# Patient Record
Sex: MALE | Race: WHITE | URBAN - METROPOLITAN AREA
[De-identification: names, ages, dates, MRNs, and addresses within clinical notes are randomized per-mention and may not be internally consistent; named-entity substitution may affect disease eponyms.]

---

## 2017-03-27 ENCOUNTER — OFFICE (OUTPATIENT)
Dept: URBAN - METROPOLITAN AREA CLINIC 1 | Facility: CLINIC | Age: 72
Setting detail: OPHTHALMOLOGY
End: 2017-03-27
Payer: MEDICARE

## 2017-03-27 DIAGNOSIS — H43.391: ICD-10-CM

## 2017-03-27 DIAGNOSIS — H02.834: ICD-10-CM

## 2017-03-27 DIAGNOSIS — H25.13: ICD-10-CM

## 2017-03-27 DIAGNOSIS — H43.392: ICD-10-CM

## 2017-03-27 DIAGNOSIS — H35.40: ICD-10-CM

## 2017-03-27 DIAGNOSIS — H02.831: ICD-10-CM

## 2017-03-27 PROCEDURE — 92014 COMPRE OPH EXAM EST PT 1/>: CPT | Performed by: OPHTHALMOLOGY

## 2017-03-27 ASSESSMENT — REFRACTION_MANIFEST
OU_VA: 20/
OS_SPHERE: -7.25
OD_AXIS: 125
OD_SPHERE: -8.00
OS_VA1: 20/
OS_CYLINDER: +2.00
OD_VA3: 20/
OD_VA1: 20/40-
OS_VA2: 20/
OS_VA1: 20/
OS_VA2: 20/
OS_VA3: 20/
OU_VA: 20/
OS_VA1: 20/25-
OS_VA3: 20/
OS_VA2: 20/
OD_VA2: 20/
OU_VA: 20/
OS_AXIS: 070
OD_CYLINDER: +2.50
OD_VA2: 20/
OD_VA1: 20/
OD_VA3: 20/
OD_VA3: 20/
OD_VA2: 20/
OD_VA1: 20/
OS_VA3: 20/

## 2017-03-27 ASSESSMENT — REFRACTION_CURRENTRX
OS_AXIS: 069
OS_OVR_VA: 20/
OD_AXIS: 124
OS_VPRISM_DIRECTION: SV
OS_SPHERE: -7.50
OD_SPHERE: -7.25
OD_CYLINDER: +2.75
OD_OVR_VA: 20/
OD_OVR_VA: 20/
OS_OVR_VA: 20/
OS_CYLINDER: +2.75
OD_OVR_VA: 20/
OS_OVR_VA: 20/
OD_VPRISM_DIRECTION: SV

## 2017-03-27 ASSESSMENT — KERATOMETRY
OD_K2POWER_DIOPTERS: 44.00
OS_K2POWER_DIOPTERS: 43.25
OS_K1POWER_DIOPTERS: 41.25
OS_AXISANGLE_DEGREES: 076
METHOD_AUTO_MANUAL: AUTO
OD_K1POWER_DIOPTERS: 41.25
OD_AXISANGLE_DEGREES: 099

## 2017-03-27 ASSESSMENT — CONFRONTATIONAL VISUAL FIELD TEST (CVF)
OS_FINDINGS: FULL
OD_FINDINGS: FULL

## 2017-03-27 ASSESSMENT — AXIALLENGTH_DERIVED
OD_AL: 26.9651
OS_AL: 26.8908
OD_AL: 30.41
OS_AL: 27.0179

## 2017-03-27 ASSESSMENT — VISUAL ACUITY
OD_BCVA: 20/30+2
OS_BCVA: 20/50-1

## 2017-03-27 ASSESSMENT — REFRACTION_AUTOREFRACTION
OD_SPHERE: -16.00
OD_CYLINDER: +6.50
OS_AXIS: 065
OD_AXIS: 108
OS_SPHERE: -7.75
OS_CYLINDER: +2.50

## 2017-03-27 ASSESSMENT — SPHEQUIV_DERIVED
OS_SPHEQUIV: -6.5
OD_SPHEQUIV: -12.75
OD_SPHEQUIV: -6.75
OS_SPHEQUIV: -6.25

## 2017-03-27 ASSESSMENT — LID POSITION - DERMATOCHALASIS
OD_DERMATOCHALASIS: RUL 1+
OS_DERMATOCHALASIS: LUL 1+

## 2018-03-26 ENCOUNTER — OFFICE (OUTPATIENT)
Dept: URBAN - METROPOLITAN AREA CLINIC 1 | Facility: CLINIC | Age: 73
Setting detail: OPHTHALMOLOGY
End: 2018-03-26
Payer: MEDICARE

## 2018-03-26 DIAGNOSIS — H25.13: ICD-10-CM

## 2018-03-26 DIAGNOSIS — H02.834: ICD-10-CM

## 2018-03-26 DIAGNOSIS — H35.40: ICD-10-CM

## 2018-03-26 DIAGNOSIS — H02.831: ICD-10-CM

## 2018-03-26 DIAGNOSIS — H43.393: ICD-10-CM

## 2018-03-26 PROCEDURE — 92014 COMPRE OPH EXAM EST PT 1/>: CPT | Performed by: OPHTHALMOLOGY

## 2018-03-26 ASSESSMENT — REFRACTION_CURRENTRX
OD_OVR_VA: 20/
OS_OVR_VA: 20/
OS_SPHERE: -8.00
OS_OVR_VA: 20/
OS_CYLINDER: +2.75
OS_VPRISM_DIRECTION: SV
OD_VPRISM_DIRECTION: SV
OD_AXIS: 118
OD_CYLINDER: +3.00
OS_OVR_VA: 20/
OD_OVR_VA: 20/
OD_OVR_VA: 20/
OS_AXIS: 070
OD_SPHERE: -8.25

## 2018-03-26 ASSESSMENT — VISUAL ACUITY
OD_BCVA: 20/30-
OS_BCVA: 20/70

## 2018-03-26 ASSESSMENT — REFRACTION_MANIFEST
OD_VA2: 20/
OD_VA2: 20/
OS_VA2: 20/
OD_VA1: 20/70
OU_VA: 20/
OS_VA1: 20/30
OD_CYLINDER: +3.00
OS_VA3: 20/
OD_AXIS: 115
OS_VA2: 20/
OD_SPHERE: -8.00
OD_VA1: 20/
OU_VA: 20/
OS_VA3: 20/
OD_VA3: 20/
OU_VA: 20/
OD_VA2: 20/
OS_VA2: 20/
OS_SPHERE: -8.00
OS_AXIS: 070
OS_CYLINDER: +3.00
OS_VA1: 20/
OD_VA3: 20/
OS_VA1: 20/
OD_VA3: 20/
OS_VA3: 20/
OD_VA1: 20/

## 2018-03-26 ASSESSMENT — KERATOMETRY
OD_AXISANGLE_DEGREES: 102
OD_K1POWER_DIOPTERS: 41.00
METHOD_AUTO_MANUAL: AUTO
OS_AXISANGLE_DEGREES: 80
OD_K2POWER_DIOPTERS: 43.25
OS_K1POWER_DIOPTERS: 41.00
OS_K2POWER_DIOPTERS: 43.00

## 2018-03-26 ASSESSMENT — SPHEQUIV_DERIVED
OS_SPHEQUIV: -6.25
OD_SPHEQUIV: -15.375
OS_SPHEQUIV: -6.5
OD_SPHEQUIV: -6.5

## 2018-03-26 ASSESSMENT — LID POSITION - DERMATOCHALASIS
OS_DERMATOCHALASIS: LUL 1+
OD_DERMATOCHALASIS: RUL 1+

## 2018-03-26 ASSESSMENT — REFRACTION_AUTOREFRACTION
OD_CYLINDER: +6.75
OS_SPHERE: -7.50
OD_SPHERE: -18.75
OD_AXIS: 120
OS_CYLINDER: +2.50
OS_AXIS: 70

## 2018-03-26 ASSESSMENT — AXIALLENGTH_DERIVED
OS_AL: 27.1389
OD_AL: 27.0783
OS_AL: 27.0106
OD_AL: 32.55

## 2018-03-26 ASSESSMENT — CONFRONTATIONAL VISUAL FIELD TEST (CVF)
OD_FINDINGS: FULL
OS_FINDINGS: FULL

## 2018-10-15 ENCOUNTER — OFFICE (OUTPATIENT)
Dept: URBAN - METROPOLITAN AREA CLINIC 1 | Facility: CLINIC | Age: 73
Setting detail: OPHTHALMOLOGY
End: 2018-10-15
Payer: MEDICARE

## 2018-10-15 DIAGNOSIS — H35.40: ICD-10-CM

## 2018-10-15 DIAGNOSIS — H43.393: ICD-10-CM

## 2018-10-15 DIAGNOSIS — H25.13: ICD-10-CM

## 2018-10-15 PROCEDURE — 99214 OFFICE O/P EST MOD 30 MIN: CPT | Performed by: OPHTHALMOLOGY

## 2018-10-15 ASSESSMENT — REFRACTION_CURRENTRX
OD_CYLINDER: +3.00
OS_OVR_VA: 20/
OS_SPHERE: -8.00
OS_VPRISM_DIRECTION: SV
OS_OVR_VA: 20/
OD_OVR_VA: 20/
OD_AXIS: 118
OD_SPHERE: -8.25
OD_OVR_VA: 20/
OD_VPRISM_DIRECTION: SV
OD_OVR_VA: 20/
OS_OVR_VA: 20/
OS_AXIS: 070
OS_CYLINDER: +2.75

## 2018-10-15 ASSESSMENT — VISUAL ACUITY
OD_BCVA: 20/30-2
OS_BCVA: 20/125

## 2018-10-15 ASSESSMENT — SPHEQUIV_DERIVED
OD_SPHEQUIV: -6.875
OS_SPHEQUIV: -6.25
OS_SPHEQUIV: -6.25
OD_SPHEQUIV: -17

## 2018-10-15 ASSESSMENT — LID POSITION - DERMATOCHALASIS
OS_DERMATOCHALASIS: LUL 1+
OD_DERMATOCHALASIS: RUL 1+

## 2018-10-15 ASSESSMENT — CONFRONTATIONAL VISUAL FIELD TEST (CVF)
OS_FINDINGS: FULL
OD_FINDINGS: FULL

## 2018-10-15 ASSESSMENT — KERATOMETRY
OD_K1POWER_DIOPTERS: 41.25
OS_K1POWER_DIOPTERS: 41.25
OS_K2POWER_DIOPTERS: 43.00
METHOD_AUTO_MANUAL: AUTO
OD_AXISANGLE_DEGREES: 101
OD_K2POWER_DIOPTERS: 43.50
OS_AXISANGLE_DEGREES: 078

## 2018-10-15 ASSESSMENT — REFRACTION_MANIFEST
OD_VA3: 20/
OS_VA1: 20/
OD_SPHERE: -8.25
OD_AXIS: 120
OS_CYLINDER: +2.50
OD_VA2: 20/
OD_VA3: 20/
OS_AXIS: 060
OD_VA2: 20/
OS_VA3: 20/
OU_VA: 20/
OS_VA3: 20/
OS_VA3: 20/
OD_VA3: 20/
OD_VA2: 20/
OD_VA1: 20/
OU_VA: 20/
OS_SPHERE: -7.50
OD_VA1: 20/150
OS_VA2: 20/
OS_VA2: 20/
OD_VA1: 20/
OU_VA: 20/
OD_CYLINDER: +2.75
OS_VA2: 20/
OS_VA1: 20/30-1
OS_VA1: 20/

## 2018-10-15 ASSESSMENT — AXIALLENGTH_DERIVED
OS_AL: 26.9505
OD_AL: 33.62
OD_AL: 27.1499
OS_AL: 26.9505

## 2018-10-15 ASSESSMENT — REFRACTION_AUTOREFRACTION
OD_AXIS: 122
OD_SPHERE: -19.25
OS_CYLINDER: +2.50
OS_AXIS: 063
OD_CYLINDER: +4.50
OS_SPHERE: -7.50

## 2018-10-31 ENCOUNTER — OFFICE (OUTPATIENT)
Dept: URBAN - METROPOLITAN AREA CLINIC 12 | Facility: CLINIC | Age: 73
Setting detail: OPHTHALMOLOGY
End: 2018-10-31
Payer: MEDICARE

## 2018-10-31 DIAGNOSIS — H25.11: ICD-10-CM

## 2018-10-31 DIAGNOSIS — H25.13: ICD-10-CM

## 2018-10-31 PROCEDURE — 92012 INTRM OPH EXAM EST PATIENT: CPT | Performed by: OPHTHALMOLOGY

## 2018-10-31 PROCEDURE — 92136 OPHTHALMIC BIOMETRY: CPT | Performed by: OPHTHALMOLOGY

## 2018-10-31 ASSESSMENT — REFRACTION_CURRENTRX
OS_VPRISM_DIRECTION: SV
OS_SPHERE: -8.00
OD_AXIS: 118
OS_OVR_VA: 20/
OS_AXIS: 070
OD_OVR_VA: 20/
OS_OVR_VA: 20/
OD_VPRISM_DIRECTION: SV
OD_SPHERE: -8.25
OD_OVR_VA: 20/
OD_CYLINDER: +3.00
OD_OVR_VA: 20/
OS_OVR_VA: 20/
OS_CYLINDER: +2.75

## 2018-10-31 ASSESSMENT — REFRACTION_MANIFEST
OU_VA: 20/
OS_SPHERE: -7.50
OD_VA2: 20/
OS_VA2: 20/
OD_VA2: 20/
OS_VA3: 20/
OD_CYLINDER: +2.75
OD_VA2: 20/
OD_VA1: 20/150
OS_VA1: 20/
OS_CYLINDER: +2.50
OS_VA1: 20/30-1
OD_VA1: 20/
OD_VA3: 20/
OD_VA3: 20/
OD_SPHERE: -8.25
OD_VA3: 20/
OU_VA: 20/
OS_VA3: 20/
OD_AXIS: 120
OS_AXIS: 060
OS_VA2: 20/
OD_VA1: 20/
OU_VA: 20/
OS_VA3: 20/
OS_VA1: 20/
OS_VA2: 20/

## 2018-10-31 ASSESSMENT — KERATOMETRY
OD_K1POWER_DIOPTERS: 41.25
OD_AXISANGLE_DEGREES: 105
METHOD_AUTO_MANUAL: AUTO
OS_K1POWER_DIOPTERS: 41.50
OS_K2POWER_DIOPTERS: 43.25
OD_K2POWER_DIOPTERS: 43.50
OS_AXISANGLE_DEGREES: 074

## 2018-10-31 ASSESSMENT — SPHEQUIV_DERIVED
OS_SPHEQUIV: -6.25
OS_SPHEQUIV: -6.625
OD_SPHEQUIV: -6.875

## 2018-10-31 ASSESSMENT — AXIALLENGTH_DERIVED
OS_AL: 26.8312
OS_AL: 27.0216
OD_AL: 27.1499

## 2018-10-31 ASSESSMENT — LID POSITION - DERMATOCHALASIS
OS_DERMATOCHALASIS: LUL 1+
OD_DERMATOCHALASIS: RUL 1+

## 2018-10-31 ASSESSMENT — CONFRONTATIONAL VISUAL FIELD TEST (CVF)
OS_FINDINGS: FULL
OD_FINDINGS: FULL

## 2018-10-31 ASSESSMENT — VISUAL ACUITY
OD_BCVA: 20/50-2
OS_BCVA: 20/150

## 2018-10-31 ASSESSMENT — REFRACTION_AUTOREFRACTION
OS_SPHERE: -5.25
OS_CYLINDER: -2.75
OS_AXIS: 165

## 2018-11-08 ENCOUNTER — AMBULATORY SURGERY CENTER (OUTPATIENT)
Dept: URBAN - METROPOLITAN AREA SURGERY 27 | Facility: SURGERY | Age: 73
Setting detail: OPHTHALMOLOGY
End: 2018-11-08
Payer: MEDICARE

## 2018-11-08 DIAGNOSIS — H52.211: ICD-10-CM

## 2018-11-08 DIAGNOSIS — H25.11: ICD-10-CM

## 2018-11-08 PROCEDURE — A9270 NON-COVERED ITEM OR SERVICE: HCPCS | Performed by: OPHTHALMOLOGY

## 2018-11-08 PROCEDURE — FEMTO CATARACT LASER: Performed by: OPHTHALMOLOGY

## 2018-11-08 PROCEDURE — 66984 XCAPSL CTRC RMVL W/O ECP: CPT | Performed by: OPHTHALMOLOGY

## 2018-11-09 ENCOUNTER — RX ONLY (RX ONLY)
Age: 73
End: 2018-11-09

## 2018-11-09 ENCOUNTER — OFFICE (OUTPATIENT)
Dept: URBAN - METROPOLITAN AREA CLINIC 12 | Facility: CLINIC | Age: 73
Setting detail: OPHTHALMOLOGY
End: 2018-11-09

## 2018-11-09 DIAGNOSIS — H25.11: ICD-10-CM

## 2018-11-09 PROCEDURE — 99024 POSTOP FOLLOW-UP VISIT: CPT | Performed by: OPTOMETRIST

## 2018-11-09 ASSESSMENT — REFRACTION_MANIFEST
OS_VA3: 20/
OS_VA1: 20/
OD_VA3: 20/
OD_VA1: 20/150
OS_VA2: 20/
OU_VA: 20/
OD_VA1: 20/
OS_VA2: 20/
OD_VA2: 20/
OS_VA3: 20/
OD_VA2: 20/
OS_VA1: 20/
OS_CYLINDER: +2.50
OS_SPHERE: -7.50
OS_VA2: 20/
OU_VA: 20/
OS_AXIS: 060
OD_SPHERE: -8.25
OD_VA3: 20/
OS_VA3: 20/
OD_VA1: 20/
OD_CYLINDER: +2.75
OD_AXIS: 120
OD_VA2: 20/
OS_VA1: 20/30-1
OD_VA3: 20/
OU_VA: 20/

## 2018-11-09 ASSESSMENT — REFRACTION_AUTOREFRACTION
OS_CYLINDER: -2.00
OD_SPHERE: +0.75
OD_CYLINDER: -1.25
OS_AXIS: 149
OS_SPHERE: -5.00
OD_AXIS: 43

## 2018-11-09 ASSESSMENT — REFRACTION_CURRENTRX
OS_VPRISM_DIRECTION: SV
OS_OVR_VA: 20/
OS_CYLINDER: +2.75
OS_OVR_VA: 20/
OS_AXIS: 070
OD_OVR_VA: 20/
OS_SPHERE: -8.00
OD_OVR_VA: 20/
OD_AXIS: 118
OD_VPRISM_DIRECTION: SV
OS_OVR_VA: 20/
OD_OVR_VA: 20/
OD_CYLINDER: +3.00
OD_SPHERE: -8.25

## 2018-11-09 ASSESSMENT — AXIALLENGTH_DERIVED
OS_AL: 26.7058
OD_AL: 23.3618
OS_AL: 26.8312
OD_AL: 26.3818

## 2018-11-09 ASSESSMENT — KERATOMETRY
OS_K1POWER_DIOPTERS: 41.50
METHOD_AUTO_MANUAL: AUTO
OS_AXISANGLE_DEGREES: 74
OD_AXISANGLE_DEGREES: 110
OD_K1POWER_DIOPTERS: 43.00
OS_K2POWER_DIOPTERS: 43.25
OD_K2POWER_DIOPTERS: 45.00

## 2018-11-09 ASSESSMENT — VISUAL ACUITY
OS_BCVA: 20/50+
OD_BCVA: 20/50

## 2018-11-09 ASSESSMENT — SPHEQUIV_DERIVED
OS_SPHEQUIV: -6.25
OD_SPHEQUIV: 0.125
OD_SPHEQUIV: -6.875
OS_SPHEQUIV: -6

## 2018-11-09 ASSESSMENT — LID POSITION - DERMATOCHALASIS
OD_DERMATOCHALASIS: RUL 1+
OS_DERMATOCHALASIS: LUL 1+

## 2018-11-09 ASSESSMENT — CONFRONTATIONAL VISUAL FIELD TEST (CVF)
OD_FINDINGS: FULL
OS_FINDINGS: FULL

## 2018-11-15 ENCOUNTER — OFFICE (OUTPATIENT)
Dept: URBAN - METROPOLITAN AREA CLINIC 1 | Facility: CLINIC | Age: 73
Setting detail: OPHTHALMOLOGY
End: 2018-11-15
Payer: MEDICARE

## 2018-11-15 DIAGNOSIS — H25.12: ICD-10-CM

## 2018-11-15 PROCEDURE — 92136 OPHTHALMIC BIOMETRY: CPT | Performed by: OPHTHALMOLOGY

## 2018-11-15 ASSESSMENT — KERATOMETRY
OS_K1POWER_DIOPTERS: 41.25
OD_K2POWER_DIOPTERS: 44.25
OD_K1POWER_DIOPTERS: 41.75
OD_AXISANGLE_DEGREES: 94
OS_AXISANGLE_DEGREES: 83
OS_K2POWER_DIOPTERS: 43.25
METHOD_AUTO_MANUAL: AUTO

## 2018-11-15 ASSESSMENT — SPHEQUIV_DERIVED
OD_SPHEQUIV: -0.375
OD_SPHEQUIV: -6.875
OS_SPHEQUIV: -6.25
OS_SPHEQUIV: -6.875

## 2018-11-15 ASSESSMENT — REFRACTION_AUTOREFRACTION
OS_SPHERE: -8.25
OS_AXIS: 72
OD_AXIS: 111
OD_SPHERE: -0.75
OS_CYLINDER: +2.75
OD_CYLINDER: +0.75

## 2018-11-15 ASSESSMENT — REFRACTION_MANIFEST
OS_VA2: 20/
OD_VA3: 20/
OD_VA3: 20/
OS_AXIS: 060
OS_VA3: 20/
OU_VA: 20/
OS_VA3: 20/
OS_VA3: 20/
OS_VA1: 20/30-1
OS_VA2: 20/
OD_VA2: 20/
OD_AXIS: 120
OS_CYLINDER: +2.50
OS_VA2: 20/
OD_VA1: 20/
OD_VA2: 20/
OU_VA: 20/
OD_VA3: 20/
OD_VA2: 20/
OD_VA1: 20/150
OD_VA1: 20/
OS_SPHERE: -7.50
OD_CYLINDER: +2.75
OS_VA1: 20/
OS_VA1: 20/
OD_SPHERE: -8.25
OU_VA: 20/

## 2018-11-15 ASSESSMENT — REFRACTION_CURRENTRX
OS_SPHERE: -8.00
OS_OVR_VA: 20/
OD_AXIS: 118
OS_CYLINDER: +2.75
OS_OVR_VA: 20/
OS_VPRISM_DIRECTION: SV
OS_OVR_VA: 20/
OD_OVR_VA: 20/
OD_OVR_VA: 20/
OS_AXIS: 070
OD_VPRISM_DIRECTION: SV
OD_CYLINDER: +3.00
OD_SPHERE: -8.25
OD_OVR_VA: 20/

## 2018-11-15 ASSESSMENT — LID POSITION - DERMATOCHALASIS
OS_DERMATOCHALASIS: LUL 1+
OD_DERMATOCHALASIS: RUL 1+

## 2018-11-15 ASSESSMENT — AXIALLENGTH_DERIVED
OS_AL: 27.2109
OD_AL: 26.8493
OS_AL: 26.8908
OD_AL: 23.9263

## 2018-11-15 ASSESSMENT — CONFRONTATIONAL VISUAL FIELD TEST (CVF)
OS_FINDINGS: FULL
OD_FINDINGS: FULL

## 2018-11-15 ASSESSMENT — VISUAL ACUITY
OS_BCVA: 20/30-2
OD_BCVA: 20/250

## 2018-11-19 ENCOUNTER — AMBULATORY SURGERY CENTER (OUTPATIENT)
Dept: URBAN - METROPOLITAN AREA SURGERY 27 | Facility: SURGERY | Age: 73
Setting detail: OPHTHALMOLOGY
End: 2018-11-19
Payer: MEDICARE

## 2018-11-19 DIAGNOSIS — H25.12: ICD-10-CM

## 2018-11-19 DIAGNOSIS — H57.03: ICD-10-CM

## 2018-11-19 DIAGNOSIS — H52.212: ICD-10-CM

## 2018-11-19 PROCEDURE — FEMTO CATARACT LASER: Performed by: OPHTHALMOLOGY

## 2018-11-19 PROCEDURE — A9270 NON-COVERED ITEM OR SERVICE: HCPCS | Performed by: OPHTHALMOLOGY

## 2018-11-19 PROCEDURE — 66982 XCAPSL CTRC RMVL CPLX WO ECP: CPT | Performed by: OPHTHALMOLOGY

## 2018-11-20 ENCOUNTER — RX ONLY (RX ONLY)
Age: 73
End: 2018-11-20

## 2018-11-20 ENCOUNTER — OFFICE (OUTPATIENT)
Dept: URBAN - METROPOLITAN AREA CLINIC 12 | Facility: CLINIC | Age: 73
Setting detail: OPHTHALMOLOGY
End: 2018-11-20

## 2018-11-20 DIAGNOSIS — H25.13: ICD-10-CM

## 2018-11-20 PROCEDURE — 99024 POSTOP FOLLOW-UP VISIT: CPT | Performed by: OPHTHALMOLOGY

## 2018-11-20 ASSESSMENT — REFRACTION_MANIFEST
OD_VA3: 20/
OS_VA2: 20/
OU_VA: 20/
OS_VA3: 20/
OD_SPHERE: -8.25
OD_VA3: 20/
OS_VA2: 20/
OS_VA1: 20/30-1
OS_VA2: 20/
OS_CYLINDER: +2.50
OD_VA2: 20/
OD_AXIS: 120
OD_VA1: 20/
OS_SPHERE: -7.50
OS_VA3: 20/
OU_VA: 20/
OD_VA1: 20/
OD_VA2: 20/
OU_VA: 20/
OD_VA1: 20/150
OS_VA1: 20/
OS_AXIS: 060
OD_CYLINDER: +2.75
OD_VA3: 20/
OD_VA2: 20/
OS_VA3: 20/
OS_VA1: 20/

## 2018-11-20 ASSESSMENT — REFRACTION_AUTOREFRACTION
OD_AXIS: 021
OS_CYLINDER: -0.25
OD_CYLINDER: -0.75
OS_SPHERE: +0.25
OD_SPHERE: PLANO
OS_AXIS: 152

## 2018-11-20 ASSESSMENT — REFRACTION_CURRENTRX
OD_CYLINDER: +3.00
OD_OVR_VA: 20/
OD_VPRISM_DIRECTION: SV
OS_OVR_VA: 20/
OD_SPHERE: -8.25
OS_CYLINDER: +2.75
OS_VPRISM_DIRECTION: SV
OD_OVR_VA: 20/
OS_OVR_VA: 20/
OS_OVR_VA: 20/
OD_AXIS: 118
OS_SPHERE: -8.00
OS_AXIS: 070
OD_OVR_VA: 20/

## 2018-11-20 ASSESSMENT — LID POSITION - DERMATOCHALASIS
OS_DERMATOCHALASIS: LUL 1+
OD_DERMATOCHALASIS: RUL 1+

## 2018-11-20 ASSESSMENT — KERATOMETRY
OD_K2POWER_DIOPTERS: 43.25
OD_AXISANGLE_DEGREES: 117
OS_AXISANGLE_DEGREES: 053
METHOD_AUTO_MANUAL: AUTO
OS_K2POWER_DIOPTERS: 42.50
OS_K1POWER_DIOPTERS: 42.25
OD_K1POWER_DIOPTERS: 42.50

## 2018-11-20 ASSESSMENT — CONFRONTATIONAL VISUAL FIELD TEST (CVF)
OD_FINDINGS: FULL
OS_FINDINGS: FULL

## 2018-11-20 ASSESSMENT — AXIALLENGTH_DERIVED
OD_AL: 26.9089
OS_AL: 26.8312
OS_AL: 23.9621

## 2018-11-20 ASSESSMENT — VISUAL ACUITY
OS_BCVA: 20/25-2
OD_BCVA: 20/40+1

## 2018-11-20 ASSESSMENT — SPHEQUIV_DERIVED
OS_SPHEQUIV: -6.25
OS_SPHEQUIV: 0.125
OD_SPHEQUIV: -6.875

## 2018-11-26 ENCOUNTER — OFFICE (OUTPATIENT)
Dept: URBAN - METROPOLITAN AREA CLINIC 1 | Facility: CLINIC | Age: 73
Setting detail: OPHTHALMOLOGY
End: 2018-11-26

## 2018-11-26 DIAGNOSIS — H25.13: ICD-10-CM

## 2018-11-26 PROCEDURE — 99024 POSTOP FOLLOW-UP VISIT: CPT | Performed by: OPHTHALMOLOGY

## 2018-11-26 ASSESSMENT — REFRACTION_AUTOREFRACTION
OS_CYLINDER: +0.50
OD_CYLINDER: +1.25
OD_AXIS: 091
OS_SPHERE: +0.25
OD_SPHERE: -1.25
OS_AXIS: 069

## 2018-11-26 ASSESSMENT — REFRACTION_MANIFEST
OS_SPHERE: -7.50
OS_VA1: 20/
OS_VA3: 20/
OU_VA: 20/
OS_VA2: 20/
OU_VA: 20/
OU_VA: 20/
OD_VA2: 20/
OD_SPHERE: -8.25
OS_VA3: 20/
OS_VA3: 20/
OD_VA1: 20/
OS_VA1: 20/
OS_VA2: 20/
OS_AXIS: 060
OD_AXIS: 120
OD_VA3: 20/
OS_VA2: 20/
OS_CYLINDER: +2.50
OD_CYLINDER: +2.75
OS_VA1: 20/30-1
OD_VA1: 20/150
OD_VA3: 20/
OD_VA3: 20/
OD_VA2: 20/
OD_VA2: 20/
OD_VA1: 20/

## 2018-11-26 ASSESSMENT — VISUAL ACUITY
OS_BCVA: 20/20
OD_BCVA: 20/20

## 2018-11-26 ASSESSMENT — LID POSITION - DERMATOCHALASIS
OS_DERMATOCHALASIS: LUL 1+
OD_DERMATOCHALASIS: RUL 1+

## 2018-11-26 ASSESSMENT — AXIALLENGTH_DERIVED
OD_AL: 27.0289
OD_AL: 24.1707
OS_AL: 26.8908
OS_AL: 23.8592

## 2018-11-26 ASSESSMENT — SPHEQUIV_DERIVED
OS_SPHEQUIV: -6.25
OS_SPHEQUIV: 0.5
OD_SPHEQUIV: -0.625
OD_SPHEQUIV: -6.875

## 2018-11-26 ASSESSMENT — REFRACTION_CURRENTRX
OD_OVR_VA: 20/
OS_OVR_VA: 20/
OS_VPRISM_DIRECTION: SV
OD_VPRISM_DIRECTION: SV
OD_SPHERE: -8.25
OS_SPHERE: -8.00
OS_AXIS: 070
OS_OVR_VA: 20/
OD_CYLINDER: +3.00
OS_OVR_VA: 20/
OD_AXIS: 118
OS_CYLINDER: +2.75

## 2018-11-26 ASSESSMENT — KERATOMETRY
OD_AXISANGLE_DEGREES: 089
METHOD_AUTO_MANUAL: AUTO
OD_K2POWER_DIOPTERS: 43.25
OD_K1POWER_DIOPTERS: 42.00
OS_AXISANGLE_DEGREES: 076
OS_K2POWER_DIOPTERS: 42.75
OS_K1POWER_DIOPTERS: 41.75

## 2018-12-03 ENCOUNTER — RX ONLY (RX ONLY)
Age: 73
End: 2018-12-03

## 2018-12-03 ENCOUNTER — OFFICE (OUTPATIENT)
Dept: URBAN - METROPOLITAN AREA CLINIC 1 | Facility: CLINIC | Age: 73
Setting detail: OPHTHALMOLOGY
End: 2018-12-03

## 2018-12-03 DIAGNOSIS — H25.13: ICD-10-CM

## 2018-12-03 PROBLEM — S05.02XA CORNEAL ABRASION; INITIAL ENCOUNTER: Status: RESOLVED | Noted: 2018-11-26 | Resolved: 2018-12-03

## 2018-12-03 PROCEDURE — 99024 POSTOP FOLLOW-UP VISIT: CPT | Performed by: OPHTHALMOLOGY

## 2018-12-03 ASSESSMENT — REFRACTION_MANIFEST
OU_VA: 20/
OD_VA3: 20/
OD_CYLINDER: +2.75
OD_SPHERE: -8.25
OU_VA: 20/
OS_SPHERE: -7.50
OS_VA2: 20/
OS_CYLINDER: +2.50
OS_VA3: 20/
OS_VA1: 20/
OS_AXIS: 060
OD_VA1: 20/150
OD_VA3: 20/
OD_AXIS: 120
OD_VA3: 20/
OD_VA2: 20/
OD_VA2: 20/
OD_VA1: 20/
OD_VA2: 20/
OS_VA1: 20/
OS_VA2: 20/
OS_VA2: 20/
OU_VA: 20/
OD_VA1: 20/
OS_VA3: 20/
OS_VA3: 20/
OS_VA1: 20/30-1

## 2018-12-03 ASSESSMENT — CONFRONTATIONAL VISUAL FIELD TEST (CVF)
OD_FINDINGS: FULL
OS_FINDINGS: FULL

## 2018-12-03 ASSESSMENT — REFRACTION_CURRENTRX
OD_OVR_VA: 20/
OD_CYLINDER: +3.00
OD_AXIS: 118
OS_VPRISM_DIRECTION: SV
OS_SPHERE: -8.00
OD_VPRISM_DIRECTION: SV
OS_OVR_VA: 20/
OS_OVR_VA: 20/
OD_OVR_VA: 20/
OS_CYLINDER: +2.75
OD_OVR_VA: 20/
OS_AXIS: 070
OS_OVR_VA: 20/
OD_SPHERE: -8.25

## 2018-12-03 ASSESSMENT — KERATOMETRY
OS_K2POWER_DIOPTERS: 42.75
METHOD_AUTO_MANUAL: AUTO
OS_AXISANGLE_DEGREES: 094
OS_K1POWER_DIOPTERS: 42.00
OD_AXISANGLE_DEGREES: 094
OD_K1POWER_DIOPTERS: 41.75
OD_K2POWER_DIOPTERS: 43.00

## 2018-12-03 ASSESSMENT — REFRACTION_AUTOREFRACTION
OD_SPHERE: -0.25
OD_CYLINDER: +0.50
OS_AXIS: 068
OD_AXIS: 109
OS_SPHERE: PLANO
OS_CYLINDER: +0.50

## 2018-12-03 ASSESSMENT — SPHEQUIV_DERIVED
OD_SPHEQUIV: 0
OD_SPHEQUIV: -6.875
OS_SPHEQUIV: -6.25

## 2018-12-03 ASSESSMENT — VISUAL ACUITY
OD_BCVA: 20/25
OS_BCVA: 20/20-2

## 2018-12-03 ASSESSMENT — LID POSITION - DERMATOCHALASIS
OD_DERMATOCHALASIS: RUL 1+
OS_DERMATOCHALASIS: LUL 1+

## 2018-12-03 ASSESSMENT — AXIALLENGTH_DERIVED
OD_AL: 27.1499
OD_AL: 24.0125
OS_AL: 26.8312

## 2018-12-20 ENCOUNTER — OFFICE (OUTPATIENT)
Dept: URBAN - METROPOLITAN AREA CLINIC 1 | Facility: CLINIC | Age: 73
Setting detail: OPHTHALMOLOGY
End: 2018-12-20
Payer: MEDICARE

## 2018-12-20 DIAGNOSIS — H25.13: ICD-10-CM

## 2018-12-20 PROCEDURE — 99024 POSTOP FOLLOW-UP VISIT: CPT | Performed by: OPHTHALMOLOGY

## 2018-12-20 ASSESSMENT — REFRACTION_MANIFEST
OD_VA3: 20/
OU_VA: 20/
OD_VA1: 20/
OS_AXIS: 085
OD_VA2: 20/
OS_VA2: 20/
OS_VA1: 20/20-
OD_AXIS: 105
OD_VA1: 20/20-
OS_CYLINDER: +0.50
OD_CYLINDER: +1.00
OD_VA2: 20/
OD_VA3: 20/
OD_SPHERE: -0.75
OS_VA3: 20/
OS_SPHERE: -0.25
OS_VA2: 20/
OS_VA3: 20/
OS_VA1: 20/
OU_VA: 20/

## 2018-12-20 ASSESSMENT — REFRACTION_AUTOREFRACTION
OS_SPHERE: +0.25
OD_CYLINDER: +0.50
OS_CYLINDER: +0.50
OD_SPHERE: -0.25
OS_AXIS: 82
OD_AXIS: 104

## 2018-12-20 ASSESSMENT — REFRACTION_CURRENTRX
OD_OVR_VA: 20/
OS_SPHERE: +2.50
OS_OVR_VA: 20/
OD_OVR_VA: 20/
OS_OVR_VA: 20/
OD_OVR_VA: 20/
OS_OVR_VA: 20/
OD_SPHERE: +2.50
OD_VPRISM_DIRECTION: SV
OS_VPRISM_DIRECTION: SV

## 2018-12-20 ASSESSMENT — LID POSITION - DERMATOCHALASIS
OD_DERMATOCHALASIS: RUL 1+
OS_DERMATOCHALASIS: LUL 1+

## 2018-12-20 ASSESSMENT — KERATOMETRY
METHOD_AUTO_MANUAL: AUTO
OD_K1POWER_DIOPTERS: 41.75
OS_K1POWER_DIOPTERS: 41.75
OD_K2POWER_DIOPTERS: 42.50
OS_K2POWER_DIOPTERS: 43.25
OS_AXISANGLE_DEGREES: 95
OD_AXISANGLE_DEGREES: 94

## 2018-12-20 ASSESSMENT — VISUAL ACUITY
OS_BCVA: 20/25-2
OD_BCVA: 20/25-2

## 2018-12-20 ASSESSMENT — AXIALLENGTH_DERIVED
OS_AL: 23.965
OD_AL: 24.2101
OS_AL: 23.7657
OD_AL: 24.108

## 2018-12-20 ASSESSMENT — SPHEQUIV_DERIVED
OD_SPHEQUIV: 0
OS_SPHEQUIV: 0.5
OS_SPHEQUIV: 0
OD_SPHEQUIV: -0.25

## 2019-03-25 ENCOUNTER — OFFICE (OUTPATIENT)
Dept: URBAN - METROPOLITAN AREA CLINIC 1 | Facility: CLINIC | Age: 74
Setting detail: OPHTHALMOLOGY
End: 2019-03-25
Payer: MEDICARE

## 2019-03-25 DIAGNOSIS — H02.834: ICD-10-CM

## 2019-03-25 DIAGNOSIS — H43.393: ICD-10-CM

## 2019-03-25 DIAGNOSIS — H02.831: ICD-10-CM

## 2019-03-25 DIAGNOSIS — H35.40: ICD-10-CM

## 2019-03-25 PROCEDURE — 92014 COMPRE OPH EXAM EST PT 1/>: CPT | Performed by: OPHTHALMOLOGY

## 2019-03-25 ASSESSMENT — REFRACTION_MANIFEST
OD_SPHERE: -0.75
OU_VA: 20/
OD_CYLINDER: +1.00
OS_VA2: 20/
OD_VA1: 20/
OD_VA2: 20/
OD_VA3: 20/
OS_VA3: 20/
OS_VA2: 20/
OD_VA3: 20/
OS_VA1: 20/20-
OU_VA: 20/
OD_VA1: 20/20-
OS_VA3: 20/
OS_AXIS: 085
OS_VA1: 20/
OD_AXIS: 105
OD_VA2: 20/
OS_SPHERE: -0.25
OS_CYLINDER: +0.50

## 2019-03-25 ASSESSMENT — VISUAL ACUITY
OS_BCVA: 20/20-1
OD_BCVA: 20/20-1

## 2019-03-25 ASSESSMENT — KERATOMETRY
OD_K2POWER_DIOPTERS: 42.75
OD_AXISANGLE_DEGREES: 095
OS_K1POWER_DIOPTERS: 42.00
METHOD_AUTO_MANUAL: AUTO
OD_K1POWER_DIOPTERS: 42.25
OS_K2POWER_DIOPTERS: 42.50
OS_AXISANGLE_DEGREES: 090

## 2019-03-25 ASSESSMENT — REFRACTION_CURRENTRX
OS_SPHERE: +2.50
OD_SPHERE: +2.50
OD_OVR_VA: 20/
OS_OVR_VA: 20/
OD_OVR_VA: 20/
OD_OVR_VA: 20/
OS_OVR_VA: 20/
OS_VPRISM_DIRECTION: SV
OD_VPRISM_DIRECTION: SV
OS_OVR_VA: 20/

## 2019-03-25 ASSESSMENT — REFRACTION_AUTOREFRACTION
OS_SPHERE: -0.25
OD_CYLINDER: +0.25
OS_AXIS: 085
OD_AXIS: 077
OS_CYLINDER: +0.75
OD_SPHERE: PLANO

## 2019-03-25 ASSESSMENT — SPHEQUIV_DERIVED
OS_SPHEQUIV: 0
OD_SPHEQUIV: -0.25
OS_SPHEQUIV: 0.125

## 2019-03-25 ASSESSMENT — AXIALLENGTH_DERIVED
OS_AL: 24.0096
OD_AL: 24.066
OS_AL: 24.0602

## 2019-03-25 ASSESSMENT — LID POSITION - DERMATOCHALASIS
OD_DERMATOCHALASIS: RUL 1+
OS_DERMATOCHALASIS: LUL 1+

## 2020-07-23 ENCOUNTER — OFFICE (OUTPATIENT)
Dept: URBAN - METROPOLITAN AREA CLINIC 1 | Facility: CLINIC | Age: 75
Setting detail: OPHTHALMOLOGY
End: 2020-07-23
Payer: MEDICARE

## 2020-07-23 DIAGNOSIS — H43.393: ICD-10-CM

## 2020-07-23 DIAGNOSIS — H02.834: ICD-10-CM

## 2020-07-23 DIAGNOSIS — H02.831: ICD-10-CM

## 2020-07-23 DIAGNOSIS — H43.813: ICD-10-CM

## 2020-07-23 PROCEDURE — 92014 COMPRE OPH EXAM EST PT 1/>: CPT | Performed by: OPHTHALMOLOGY

## 2020-07-23 ASSESSMENT — REFRACTION_AUTOREFRACTION
OS_CYLINDER: +0.75
OS_SPHERE: PL
OD_AXIS: 161
OD_SPHERE: PL
OS_AXIS: 080
OD_CYLINDER: +0.25

## 2020-07-23 ASSESSMENT — REFRACTION_CURRENTRX
OS_VPRISM_DIRECTION: SV
OD_SPHERE: +2.25
OD_VPRISM_DIRECTION: SV
OS_SPHERE: +2.25
OD_OVR_VA: 20/
OS_OVR_VA: 20/

## 2020-07-23 ASSESSMENT — REFRACTION_MANIFEST
OS_VA1: 20/20-
OD_SPHERE: PL
OS_SPHERE: PL
OS_CYLINDER: +0.50
OD_VA1: 20/20-1
OS_AXIS: 080
OD_AXIS: 165
OD_CYLINDER: +0.25

## 2020-07-23 ASSESSMENT — TONOMETRY
OS_IOP_MMHG: 19
OD_IOP_MMHG: 18

## 2020-07-23 ASSESSMENT — KERATOMETRY
OD_K2POWER_DIOPTERS: 43.00
OS_K1POWER_DIOPTERS: 42.00
METHOD_AUTO_MANUAL: AUTO
OD_K1POWER_DIOPTERS: 42.00
OD_AXISANGLE_DEGREES: 086
OS_K2POWER_DIOPTERS: 43.75
OS_AXISANGLE_DEGREES: 093

## 2020-07-23 ASSESSMENT — VISUAL ACUITY
OS_BCVA: 20/20-1
OD_BCVA: 20/25

## 2020-07-23 ASSESSMENT — LID EXAM ASSESSMENTS
OD_BLEPHARITIS: RUL
OS_BLEPHARITIS: LUL

## 2020-07-23 ASSESSMENT — CONFRONTATIONAL VISUAL FIELD TEST (CVF)
OD_FINDINGS: FULL
OS_FINDINGS: FULL

## 2020-07-23 ASSESSMENT — LID POSITION - DERMATOCHALASIS
OS_DERMATOCHALASIS: LUL 1+
OD_DERMATOCHALASIS: RUL 1+

## 2021-07-21 PROBLEM — H40.033 NARROW ANGLES; BOTH EYES: Status: ACTIVE | Noted: 2019-07-16

## 2021-07-22 ENCOUNTER — OFFICE (OUTPATIENT)
Dept: URBAN - METROPOLITAN AREA CLINIC 1 | Facility: CLINIC | Age: 76
Setting detail: OPHTHALMOLOGY
End: 2021-07-22
Payer: MEDICARE

## 2021-07-22 DIAGNOSIS — H01.001: ICD-10-CM

## 2021-07-22 DIAGNOSIS — H43.393: ICD-10-CM

## 2021-07-22 DIAGNOSIS — H43.813: ICD-10-CM

## 2021-07-22 DIAGNOSIS — H02.831: ICD-10-CM

## 2021-07-22 DIAGNOSIS — H35.40: ICD-10-CM

## 2021-07-22 DIAGNOSIS — H01.004: ICD-10-CM

## 2021-07-22 DIAGNOSIS — Z96.1: ICD-10-CM

## 2021-07-22 DIAGNOSIS — H02.834: ICD-10-CM

## 2021-07-22 PROCEDURE — 92014 COMPRE OPH EXAM EST PT 1/>: CPT | Performed by: OPHTHALMOLOGY

## 2021-07-22 ASSESSMENT — REFRACTION_CURRENTRX
OD_SPHERE: +2.25
OD_OVR_VA: 20/
OS_OVR_VA: 20/
OS_SPHERE: +2.25
OD_VPRISM_DIRECTION: SV
OS_VPRISM_DIRECTION: SV

## 2021-07-22 ASSESSMENT — REFRACTION_MANIFEST
OD_CYLINDER: -0.50
OD_VA1: 20/20
OD_SPHERE: +0.50
OS_VA1: 20/20
OS_CYLINDER: -0.75
OS_SPHERE: +0.75
OD_AXIS: 175
OS_AXIS: 170

## 2021-07-22 ASSESSMENT — REFRACTION_AUTOREFRACTION
OS_CYLINDER: -0.75
OD_CYLINDER: -0.50
OS_SPHERE: +0.75
OS_AXIS: 170
OD_AXIS: 175
OD_SPHERE: +0.50

## 2021-07-22 ASSESSMENT — SPHEQUIV_DERIVED
OS_SPHEQUIV: 0.375
OS_SPHEQUIV: 0.375
OD_SPHEQUIV: 0.25
OD_SPHEQUIV: 0.25

## 2021-07-22 ASSESSMENT — KERATOMETRY
OD_K1POWER_DIOPTERS: 41.00
OD_AXISANGLE_DEGREES: 80
OS_K1POWER_DIOPTERS: 41.50
OD_K2POWER_DIOPTERS: 42.25
METHOD_AUTO_MANUAL: AUTO
OS_AXISANGLE_DEGREES: 89
OS_K2POWER_DIOPTERS: 43.25

## 2021-07-22 ASSESSMENT — LID POSITION - DERMATOCHALASIS
OS_DERMATOCHALASIS: LUL 1+
OD_DERMATOCHALASIS: RUL 1+

## 2021-07-22 ASSESSMENT — LID EXAM ASSESSMENTS
OD_BLEPHARITIS: RUL
OS_BLEPHARITIS: LUL

## 2021-07-22 ASSESSMENT — AXIALLENGTH_DERIVED
OS_AL: 23.8621
OD_AL: 24.1984
OS_AL: 23.8621
OD_AL: 24.1984

## 2021-07-22 ASSESSMENT — CONFRONTATIONAL VISUAL FIELD TEST (CVF)
OS_FINDINGS: FULL
OD_FINDINGS: FULL

## 2021-07-22 ASSESSMENT — VISUAL ACUITY
OD_BCVA: 20/20
OS_BCVA: 20/20

## 2021-07-22 ASSESSMENT — TONOMETRY
OD_IOP_MMHG: 16
OS_IOP_MMHG: 13

## 2022-07-21 ENCOUNTER — OFFICE (OUTPATIENT)
Dept: URBAN - METROPOLITAN AREA CLINIC 1 | Facility: CLINIC | Age: 77
Setting detail: OPHTHALMOLOGY
End: 2022-07-21
Payer: MEDICARE

## 2022-07-21 DIAGNOSIS — H35.40: ICD-10-CM

## 2022-07-21 DIAGNOSIS — H43.393: ICD-10-CM

## 2022-07-21 DIAGNOSIS — H02.834: ICD-10-CM

## 2022-07-21 DIAGNOSIS — H02.831: ICD-10-CM

## 2022-07-21 DIAGNOSIS — H01.001: ICD-10-CM

## 2022-07-21 DIAGNOSIS — Z96.1: ICD-10-CM

## 2022-07-21 DIAGNOSIS — H01.004: ICD-10-CM

## 2022-07-21 DIAGNOSIS — H43.813: ICD-10-CM

## 2022-07-21 PROBLEM — H35.412 LATTICE DEGENERATION; LEFT EYE: Status: ACTIVE | Noted: 2022-07-21

## 2022-07-21 PROCEDURE — 92014 COMPRE OPH EXAM EST PT 1/>: CPT | Performed by: OPHTHALMOLOGY

## 2022-07-21 ASSESSMENT — REFRACTION_AUTOREFRACTION
OS_SPHERE: +0.50
OD_CYLINDER: -0.25
OD_SPHERE: +0.50
OS_CYLINDER: -0.50
OS_AXIS: 157
OD_AXIS: 146

## 2022-07-21 ASSESSMENT — VISUAL ACUITY
OS_BCVA: 20/20-2
OD_BCVA: 20/20-1

## 2022-07-21 ASSESSMENT — TONOMETRY
OD_IOP_MMHG: 16
OS_IOP_MMHG: 13

## 2022-07-21 ASSESSMENT — SPHEQUIV_DERIVED
OS_SPHEQUIV: 0.25
OD_SPHEQUIV: 0.375
OD_SPHEQUIV: 0.375
OS_SPHEQUIV: 0.25

## 2022-07-21 ASSESSMENT — REFRACTION_CURRENTRX
OD_OVR_VA: 20/
OS_VPRISM_DIRECTION: SV
OD_VPRISM_DIRECTION: SV
OD_SPHERE: +2.25
OS_SPHERE: +2.25
OS_OVR_VA: 20/

## 2022-07-21 ASSESSMENT — KERATOMETRY
OD_K1POWER_DIOPTERS: 41.75
OS_AXISANGLE_DEGREES: 089
OS_K1POWER_DIOPTERS: 42.00
OS_K2POWER_DIOPTERS: 43.00
OD_AXISANGLE_DEGREES: 082
METHOD_AUTO_MANUAL: AUTO
OD_K2POWER_DIOPTERS: 42.50

## 2022-07-21 ASSESSMENT — REFRACTION_MANIFEST
OD_SPHERE: +0.50
OD_CYLINDER: -0.25
OD_AXIS: 146
OS_AXIS: 157
OS_CYLINDER: -0.50
OS_SPHERE: +0.50
OD_VA1: 20/20
OS_VA1: 20/20

## 2022-07-21 ASSESSMENT — LID POSITION - DERMATOCHALASIS
OD_DERMATOCHALASIS: RUL 1+
OS_DERMATOCHALASIS: LUL 1+

## 2022-07-21 ASSESSMENT — AXIALLENGTH_DERIVED
OD_AL: 23.9564
OD_AL: 23.9564
OS_AL: 23.8649
OS_AL: 23.8649

## 2022-07-21 ASSESSMENT — LID EXAM ASSESSMENTS
OS_BLEPHARITIS: LUL
OD_BLEPHARITIS: RUL

## 2022-07-21 ASSESSMENT — CONFRONTATIONAL VISUAL FIELD TEST (CVF)
OS_FINDINGS: FULL
OD_FINDINGS: FULL

## 2024-02-12 ENCOUNTER — HOSPITAL ENCOUNTER (EMERGENCY)
Facility: HOSPITAL | Age: 79
Discharge: HOME | End: 2024-02-12
Attending: STUDENT IN AN ORGANIZED HEALTH CARE EDUCATION/TRAINING PROGRAM
Payer: MEDICARE

## 2024-02-12 VITALS
DIASTOLIC BLOOD PRESSURE: 82 MMHG | OXYGEN SATURATION: 94 % | BODY MASS INDEX: 26.55 KG/M2 | WEIGHT: 196 LBS | TEMPERATURE: 97.7 F | HEART RATE: 82 BPM | RESPIRATION RATE: 18 BRPM | SYSTOLIC BLOOD PRESSURE: 145 MMHG | HEIGHT: 72 IN

## 2024-02-12 DIAGNOSIS — N48.30 PRIAPISM: Primary | ICD-10-CM

## 2024-02-12 LAB
ALBUMIN SERPL-MCNC: 3.7 G/DL (ref 3.5–5)
ALBUMIN/GLOB SERPL: 1.1 (ref 1.1–2.2)
ALP SERPL-CCNC: 80 U/L (ref 45–117)
ALT SERPL-CCNC: 24 U/L (ref 12–78)
ANION GAP SERPL CALC-SCNC: 4 MMOL/L (ref 5–15)
AST SERPL-CCNC: 21 U/L (ref 15–37)
BASOPHILS # BLD: 0 K/UL (ref 0–0.1)
BASOPHILS NFR BLD: 1 % (ref 0–1)
BILIRUB SERPL-MCNC: 0.7 MG/DL (ref 0.2–1)
BUN SERPL-MCNC: 19 MG/DL (ref 6–20)
BUN/CREAT SERPL: 23 (ref 12–20)
CALCIUM SERPL-MCNC: 9.3 MG/DL (ref 8.5–10.1)
CHLORIDE SERPL-SCNC: 109 MMOL/L (ref 97–108)
CO2 SERPL-SCNC: 28 MMOL/L (ref 21–32)
CREAT SERPL-MCNC: 0.83 MG/DL (ref 0.7–1.3)
DIFFERENTIAL METHOD BLD: NORMAL
EOSINOPHIL # BLD: 0.2 K/UL (ref 0–0.4)
EOSINOPHIL NFR BLD: 3 % (ref 0–7)
ERYTHROCYTE [DISTWIDTH] IN BLOOD BY AUTOMATED COUNT: 13.5 % (ref 11.5–14.5)
GLOBULIN SER CALC-MCNC: 3.3 G/DL (ref 2–4)
GLUCOSE SERPL-MCNC: 125 MG/DL (ref 65–100)
HCT VFR BLD AUTO: 40.9 % (ref 36.6–50.3)
HGB BLD-MCNC: 14.1 G/DL (ref 12.1–17)
IMM GRANULOCYTES # BLD AUTO: 0 K/UL (ref 0–0.04)
IMM GRANULOCYTES NFR BLD AUTO: 0 % (ref 0–0.5)
INR PPP: 1 (ref 0.9–1.1)
LYMPHOCYTES # BLD: 0.9 K/UL (ref 0.8–3.5)
LYMPHOCYTES NFR BLD: 15 % (ref 12–49)
MCH RBC QN AUTO: 31.3 PG (ref 26–34)
MCHC RBC AUTO-ENTMCNC: 34.5 G/DL (ref 30–36.5)
MCV RBC AUTO: 90.7 FL (ref 80–99)
MONOCYTES # BLD: 0.6 K/UL (ref 0–1)
MONOCYTES NFR BLD: 10 % (ref 5–13)
NEUTS SEG # BLD: 4.6 K/UL (ref 1.8–8)
NEUTS SEG NFR BLD: 71 % (ref 32–75)
NRBC # BLD: 0 K/UL (ref 0–0.01)
NRBC BLD-RTO: 0 PER 100 WBC
PLATELET # BLD AUTO: 231 K/UL (ref 150–400)
PMV BLD AUTO: 10.3 FL (ref 8.9–12.9)
POTASSIUM SERPL-SCNC: 4.1 MMOL/L (ref 3.5–5.1)
PROT SERPL-MCNC: 7 G/DL (ref 6.4–8.2)
PROTHROMBIN TIME: 10.3 SEC (ref 9–11.1)
RBC # BLD AUTO: 4.51 M/UL (ref 4.1–5.7)
SODIUM SERPL-SCNC: 141 MMOL/L (ref 136–145)
WBC # BLD AUTO: 6.4 K/UL (ref 4.1–11.1)

## 2024-02-12 PROCEDURE — 36415 COLL VENOUS BLD VENIPUNCTURE: CPT

## 2024-02-12 PROCEDURE — 96374 THER/PROPH/DIAG INJ IV PUSH: CPT

## 2024-02-12 PROCEDURE — 85025 COMPLETE CBC W/AUTO DIFF WBC: CPT

## 2024-02-12 PROCEDURE — 6360000002 HC RX W HCPCS: Performed by: STUDENT IN AN ORGANIZED HEALTH CARE EDUCATION/TRAINING PROGRAM

## 2024-02-12 PROCEDURE — 80053 COMPREHEN METABOLIC PANEL: CPT

## 2024-02-12 PROCEDURE — 64400 NJX AA&/STRD TRIGEMINAL NRV: CPT

## 2024-02-12 PROCEDURE — 99284 EMERGENCY DEPT VISIT MOD MDM: CPT

## 2024-02-12 PROCEDURE — 2500000003 HC RX 250 WO HCPCS: Performed by: STUDENT IN AN ORGANIZED HEALTH CARE EDUCATION/TRAINING PROGRAM

## 2024-02-12 PROCEDURE — 85610 PROTHROMBIN TIME: CPT

## 2024-02-12 PROCEDURE — 96375 TX/PRO/DX INJ NEW DRUG ADDON: CPT

## 2024-02-12 RX ORDER — ONDANSETRON 2 MG/ML
4 INJECTION INTRAMUSCULAR; INTRAVENOUS ONCE
Status: COMPLETED | OUTPATIENT
Start: 2024-02-12 | End: 2024-02-12

## 2024-02-12 RX ORDER — MORPHINE SULFATE 4 MG/ML
4 INJECTION, SOLUTION INTRAMUSCULAR; INTRAVENOUS
Status: COMPLETED | OUTPATIENT
Start: 2024-02-12 | End: 2024-02-12

## 2024-02-12 RX ORDER — LIDOCAINE HYDROCHLORIDE 10 MG/ML
5 INJECTION, SOLUTION EPIDURAL; INFILTRATION; INTRACAUDAL; PERINEURAL ONCE
Status: COMPLETED | OUTPATIENT
Start: 2024-02-12 | End: 2024-02-12

## 2024-02-12 RX ADMIN — MORPHINE SULFATE 4 MG: 4 INJECTION, SOLUTION INTRAMUSCULAR; INTRAVENOUS at 02:33

## 2024-02-12 RX ADMIN — Medication 100 MCG: at 04:53

## 2024-02-12 RX ADMIN — LIDOCAINE HYDROCHLORIDE 5 ML: 10 INJECTION, SOLUTION EPIDURAL; INFILTRATION; INTRACAUDAL; PERINEURAL at 04:45

## 2024-02-12 RX ADMIN — Medication 100 MCG: at 04:50

## 2024-02-12 RX ADMIN — Medication 100 MCG: at 04:56

## 2024-02-12 RX ADMIN — ONDANSETRON 4 MG: 2 INJECTION INTRAMUSCULAR; INTRAVENOUS at 02:34

## 2024-02-12 NOTE — ED TRIAGE NOTES
Patient ambulatory to Triage with c/o having an erection for the past 4 hours after using a penile injection.

## 2024-02-12 NOTE — ED PROVIDER NOTES
chart in the absence of a cardiologist.        RADIOLOGY:   Non-plain film images such as CT, Ultrasound and MRI are read by the radiologist. Plain radiographic images are visualized and preliminarily interpreted by the emergency physician with the below findings:        Interpretation per the Radiologist below, if available at the time of this note:    No orders to display        LABS:  Labs Reviewed   CBC WITH AUTO DIFFERENTIAL   COMPREHENSIVE METABOLIC PANEL   PROTIME-INR       All other labs were within normal range or not returned as of this dictation.    EMERGENCY DEPARTMENT COURSE and DIFFERENTIAL DIAGNOSIS/MDM:   Vitals:    Vitals:    02/12/24 0139   BP: (!) 145/82   Pulse: 82   Resp: 18   Temp: 97.7 °F (36.5 °C)   TempSrc: Oral   SpO2: 94%   Weight: 88.9 kg (196 lb)   Height: 1.829 m (6')           Medical Decision Making  Amount and/or Complexity of Data Reviewed  Labs: ordered.    Risk  Prescription drug management.            REASSESSMENT     ED Course as of 02/12/24 0504   Mon Feb 12, 2024 0317 Currently waiting for urology to call back regarding the patient's preop is him.  Patient was given morphine for pain. [WG]   0321 Speaking to Dr Hassan with Urology [WG]   0503 I spoke to urologist Dr. Jignesh Hassan regarding procedure.  Performed a penile nerve block utilizing lidocaine.  I inserted a butterfly needle at the 2 o'clock position and was able to aspirate 100 mL of dark blood.  I infused 100 mg aliquots of phenylephrine 1 mL of 100 mcg/mL.  Patient had detumescence of about greater than 50%.  His pain is currently a 0.  I called and spoke to urologist once again who recommended patient be discharged and will follow-up with his office today.  They will reach out to make an appointment.  Recommended placing ice on his penis for pain. [WG]      ED Course User Index  [WG] Dwight Romero DO           CONSULTS:  IP CONSULT TO UROLOGY    PROCEDURES:  Unless otherwise noted below, none     Nerve

## 2024-02-12 NOTE — DISCHARGE INSTRUCTIONS
Please call Dr. Hassan's office this morning.  They will also reach out to you.  He is directed to come to his office for further evaluation.  Additionally he recommended using ice packs for pain.  If any worsening concerning symptoms return to the emergency department

## 2024-02-12 NOTE — ED NOTES
1st call to urology @0214, was told to wait 30 minutes. 2nd call @0246, was told to wait 10-15 more minutes. At the 3rd call to urology, was told they handle it differently after that many calls and they'll reach out again to on-call doctor.